# Patient Record
Sex: MALE | Race: OTHER | NOT HISPANIC OR LATINO | URBAN - METROPOLITAN AREA
[De-identification: names, ages, dates, MRNs, and addresses within clinical notes are randomized per-mention and may not be internally consistent; named-entity substitution may affect disease eponyms.]

---

## 2022-06-28 ENCOUNTER — NEW PATIENT (OUTPATIENT)
Dept: URBAN - METROPOLITAN AREA CLINIC 50 | Facility: CLINIC | Age: 67
End: 2022-06-28

## 2022-06-28 DIAGNOSIS — H43.813: ICD-10-CM

## 2022-06-28 DIAGNOSIS — E11.9: ICD-10-CM

## 2022-06-28 DIAGNOSIS — H25.13: ICD-10-CM

## 2022-06-28 PROCEDURE — 92235 FLUORESCEIN ANGRPH MLTIFRAME: CPT

## 2022-06-28 PROCEDURE — 92134 CPTRZ OPH DX IMG PST SGM RTA: CPT

## 2022-06-28 PROCEDURE — 99204 OFFICE O/P NEW MOD 45 MIN: CPT

## 2022-06-28 PROCEDURE — 92201 OPSCPY EXTND RTA DRAW UNI/BI: CPT

## 2022-06-28 PROCEDURE — 92250 FUNDUS PHOTOGRAPHY W/I&R: CPT | Mod: NC

## 2022-06-28 ASSESSMENT — TONOMETRY
OD_IOP_MMHG: 20
OS_IOP_MMHG: 12

## 2022-06-28 ASSESSMENT — VISUAL ACUITY
OD_SC: 20/50-1
OS_SC: 20/60-2
OS_PH: 20/25-2
OD_PH: 20/25-2

## 2023-03-31 ENCOUNTER — APPOINTMENT (OUTPATIENT)
Dept: LAB | Facility: LAB | Age: 68
End: 2023-03-31
Payer: MEDICARE

## 2023-03-31 LAB
ABO GROUP (TYPE) IN BLOOD: NORMAL
ACTIVATED PARTIAL THROMBOPLASTIN TIME IN PPP BY COAGULATION ASSAY: 29 SEC (ref 26–39)
ALANINE AMINOTRANSFERASE (SGPT) (U/L) IN SER/PLAS: 8 U/L (ref 10–52)
ALBUMIN (G/DL) IN SER/PLAS: 3.8 G/DL (ref 3.4–5)
ALKALINE PHOSPHATASE (U/L) IN SER/PLAS: 71 U/L (ref 33–136)
AMYLASE (U/L) IN SER/PLAS: 45 U/L (ref 29–103)
APPEARANCE, URINE: CLEAR
ASPARTATE AMINOTRANSFERASE (SGOT) (U/L) IN SER/PLAS: 15 U/L (ref 9–39)
BILIRUBIN DIRECT (MG/DL) IN SER/PLAS: 0.1 MG/DL (ref 0–0.3)
BILIRUBIN TOTAL (MG/DL) IN SER/PLAS: 0.4 MG/DL (ref 0–1.2)
BILIRUBIN, URINE: NEGATIVE
BLOOD, URINE: ABNORMAL
C PEPTIDE (NG/ML) IN SER/PLAS: 20 NG/ML (ref 0.7–3.9)
COLOR, URINE: YELLOW
CREATININE (MG/DL) IN SER/PLAS: 6.31 MG/DL (ref 0.5–1.3)
EBV INTERPRETATION: ABNORMAL
EPSTEIN-BARR VCA IGG: POSITIVE
EPSTEIN-BARR VCA IGM: NEGATIVE
EPSTEIN-BARR VIRUS EARLY ANTIGEN ANTIBODY, IGG: NEGATIVE
EPSTIEN-BARR NUCLEAR ANTIGEN AB: POSITIVE
ERYTHROCYTE DISTRIBUTION WIDTH (RATIO) BY AUTOMATED COUNT: 13.6 % (ref 11.5–14.5)
ERYTHROCYTE MEAN CORPUSCULAR HEMOGLOBIN CONCENTRATION (G/DL) BY AUTOMATED: 31.5 G/DL (ref 32–36)
ERYTHROCYTE MEAN CORPUSCULAR VOLUME (FL) BY AUTOMATED COUNT: 95 FL (ref 80–100)
ERYTHROCYTES (10*6/UL) IN BLOOD BY AUTOMATED COUNT: 3.77 X10E12/L (ref 4.5–5.9)
ESTIMATED AVERAGE GLUCOSE FOR HBA1C: 117 MG/DL
FLOW AUTOCROSSMATCH: NORMAL
GFR MALE: 9 ML/MIN/1.73M2
GLUCOSE, URINE: ABNORMAL MG/DL
HEMATOCRIT (%) IN BLOOD BY AUTOMATED COUNT: 35.9 % (ref 41–52)
HEMOGLOBIN (G/DL) IN BLOOD: 11.3 G/DL (ref 13.5–17.5)
HEMOGLOBIN A1C/HEMOGLOBIN TOTAL IN BLOOD: 5.7 %
HEPATITIS B VIRUS CORE AB (PRESENCE) IN SER/PLAS BY IMM: NONREACTIVE
HEPATITIS B VIRUS SURFACE AB (MIU/ML) IN SERUM: 223.5 MIU/ML
HEPATITIS B VIRUS SURFACE AG PRESENCE IN SERUM: NONREACTIVE
HEPATITIS C VIRUS AB PRESENCE IN SERUM: NONREACTIVE
HIV 1/ 2 AG/AB SCREEN: NONREACTIVE
HLA CLASS I ANTIBODY SCREEN, FLOW CYTOMETRY: NORMAL
HLA CLASS II ANTIBODY SCREEN, FLOW CYTOMETRY: NORMAL
HLA-A LOCUS LOW RESOLUTION TYPING: NORMAL
HLA-B LOCUS LOW RESOLUTION TYPING: NORMAL
HLA-C LOCUS LOW RESOLUTION TYPING: NORMAL
HLA-DPB1 HIGH RESOLUTION TYPING: NORMAL
HLA-DQB1 HIGH RESOLUTION TYPING: NORMAL
HLA-DR1/3/4/5 + DQB1 LOW RES TYPING: NORMAL
INR IN PPP BY COAGULATION ASSAY: 0.9 (ref 0.9–1.1)
KETONES, URINE: NEGATIVE MG/DL
LEUKOCYTE ESTERASE, URINE: NEGATIVE
LEUKOCYTES (10*3/UL) IN BLOOD BY AUTOMATED COUNT: 10.1 X10E9/L (ref 4.4–11.3)
NITRITE, URINE: NEGATIVE
NRBC (PER 100 WBCS) BY AUTOMATED COUNT: 0 /100 WBC (ref 0–0)
PH, URINE: 8 (ref 5–8)
PHOSPHATE (MG/DL) IN SER/PLAS: 4 MG/DL (ref 2.5–4.9)
PLATELETS (10*3/UL) IN BLOOD AUTOMATED COUNT: 316 X10E9/L (ref 150–450)
PROSTATE SPECIFIC AG (NG/ML) IN SER/PLAS: 0.57 NG/ML (ref 0–4)
PROTEIN TOTAL: 7.1 G/DL (ref 6.4–8.2)
PROTEIN, URINE: ABNORMAL MG/DL
PROTHROMBIN TIME (PT) IN PPP BY COAGULATION ASSAY: 10.1 SEC (ref 9.8–13.4)
RBC, URINE: 2 /HPF (ref 0–5)
RH FACTOR: NORMAL
SPECIFIC GRAVITY, URINE: 1.01 (ref 1–1.03)
SQUAMOUS EPITHELIAL CELLS, URINE: <1 /HPF
SYPHILIS TOTAL AB: NONREACTIVE
UREA NITROGEN (MG/DL) IN SER/PLAS: 21 MG/DL (ref 6–23)
UROBILINOGEN, URINE: <2 MG/DL (ref 0–1.9)
WBC, URINE: 6 /HPF (ref 0–5)

## 2023-04-03 LAB
NIL(NEG) CONTROL SPOT COUNT: ABNORMAL
PANEL A SPOT COUNT: 46
PANEL B SPOT COUNT: 38
POS CONTROL SPOT COUNT: ABNORMAL
T-SPOT. TB INTERPRETATION: ABNORMAL

## 2023-04-04 LAB
AMPHETAMINE SCREEN BLOOD: NEGATIVE NG/ML
BARBITURATE SCREEN BLOOD: NEGATIVE NG/ML
BENZODIAZEPINES SCREEN BLOOD: NEGATIVE NG/ML
BUPRENORPHINE SCREEN BLOOD: NEGATIVE NG/ML
CANNABINOIDS SCREEN BLOOD: NEGATIVE NG/ML
COCAINE SCREEN BLOOD: NEGATIVE NG/ML
DRUG SCREEN COMMENT BLOOD: NORMAL
METHADONE SCREEN BLOOD: NEGATIVE NG/ML
METHAMPHETAMINE, BLOOD, SCREEN: NEGATIVE NG/ML
OPIATE SCREEN BLOOD: NEGATIVE NG/ML
OXYCODONE SCREEN BLOOD: NEGATIVE NG/ML
PHENCYCLIDINE SCREEN BLOOD: NEGATIVE NG/ML

## 2023-04-07 LAB
COTININE BLOOD QUANTITATIVE: 188 NG/ML
NICOTINE BLOOD QUANTITATIVE: 6 NG/ML

## 2023-11-06 ENCOUNTER — TELEPHONE (OUTPATIENT)
Dept: TRANSPLANT | Facility: HOSPITAL | Age: 68
End: 2023-11-06
Payer: MEDICARE

## 2023-11-06 DIAGNOSIS — Z01.818 PRE-TRANSPLANT EVALUATION FOR KIDNEY TRANSPLANT: ICD-10-CM

## 2023-11-06 NOTE — TELEPHONE ENCOUNTER
Spoke to daughter Duncan today.  Her dad is coming in for a cardiology appt on Nov 13; emailed her the information.  Let her know to get labs drawn as well so we can make sure he has an updated HLA and 2nd ABO.  She will send me the details of his colonoscopy.    She did let me know he was recently admitted for pneumonia but is doing better now.    She also has updated cardiac testing that she is going to bring to the upcoming cardiology appointment.

## 2023-11-10 PROBLEM — Z99.2 END STAGE RENAL FAILURE ON DIALYSIS (MULTI): Chronic | Status: ACTIVE | Noted: 2019-03-19

## 2023-11-10 PROBLEM — I10 ESSENTIAL HYPERTENSION: Chronic | Status: ACTIVE | Noted: 2023-11-10

## 2023-11-10 PROBLEM — E83.39 HYPERPHOSPHATEMIA: Chronic | Status: ACTIVE | Noted: 2023-11-10

## 2023-11-10 PROBLEM — Z94.9 TRANSPLANT: Status: ACTIVE | Noted: 2023-11-10

## 2023-11-10 PROBLEM — N39.0 URINARY TRACT INFECTIOUS DISEASE: Chronic | Status: ACTIVE | Noted: 2023-11-10

## 2023-11-10 PROBLEM — N28.9 DISORDER OF KIDNEY: Status: ACTIVE | Noted: 2023-11-10

## 2023-11-10 PROBLEM — L29.9 PRURITUS: Chronic | Status: ACTIVE | Noted: 2023-11-10

## 2023-11-10 PROBLEM — E11.49 DM (DIABETES MELLITUS), TYPE 2 WITH NEUROLOGICAL COMPLICATIONS (MULTI): Status: ACTIVE | Noted: 2023-11-10

## 2023-11-10 PROBLEM — E11.9 DIABETES MELLITUS (MULTI): Status: ACTIVE | Noted: 2023-11-10

## 2023-11-10 PROBLEM — N18.6 END STAGE RENAL FAILURE ON DIALYSIS (MULTI): Chronic | Status: ACTIVE | Noted: 2019-03-19

## 2023-11-10 PROBLEM — Z01.818 PRE-TRANSPLANT EVALUATION FOR KIDNEY TRANSPLANT: Status: ACTIVE | Noted: 2023-11-10

## 2023-11-10 RX ORDER — HYDROXYZINE HYDROCHLORIDE 10 MG/1
10 TABLET, FILM COATED ORAL
COMMUNITY
Start: 2022-03-31

## 2023-11-10 RX ORDER — SITAGLIPTIN 50 MG/1
1 TABLET, FILM COATED ORAL DAILY
COMMUNITY
Start: 2022-04-24

## 2023-11-10 RX ORDER — GUAIFENESIN 100 MG/5ML
SOLUTION ORAL
COMMUNITY
Start: 2023-06-13

## 2023-11-10 RX ORDER — HYDRALAZINE HYDROCHLORIDE 50 MG/1
1 TABLET, FILM COATED ORAL 3 TIMES DAILY
COMMUNITY
Start: 2023-06-16

## 2023-11-10 RX ORDER — DIPHENHYDRAMINE HCL 25 MG
TABLET ORAL
COMMUNITY
Start: 2021-03-29

## 2023-11-10 RX ORDER — BISACODYL 5 MG
10 TABLET, DELAYED RELEASE (ENTERIC COATED) ORAL
COMMUNITY
Start: 2021-03-29

## 2023-11-10 RX ORDER — ROSUVASTATIN CALCIUM 20 MG/1
1 TABLET, COATED ORAL DAILY
COMMUNITY
Start: 2022-03-11

## 2023-11-10 RX ORDER — FERRIC CITRATE 210 MG/1
420 TABLET, COATED ORAL
COMMUNITY

## 2023-11-10 RX ORDER — AMLODIPINE BESYLATE 10 MG/1
1 TABLET ORAL DAILY
COMMUNITY
Start: 2023-07-05

## 2023-11-10 RX ORDER — VALSARTAN 160 MG/1
1 TABLET ORAL DAILY
COMMUNITY
Start: 2022-04-24

## 2023-11-10 RX ORDER — CALCIUM CITRATE/VITAMIN D3 200MG-6.25
TABLET ORAL
COMMUNITY
Start: 2023-06-11

## 2023-11-10 RX ORDER — FOLIC ACID/VIT B COMPLEX AND C 0.8 MG
TABLET ORAL
COMMUNITY
Start: 2023-09-21

## 2023-11-10 RX ORDER — CARVEDILOL 12.5 MG/1
1 TABLET ORAL 2 TIMES DAILY
COMMUNITY
Start: 2023-06-16

## 2023-11-10 RX ORDER — ACETAMINOPHEN 500 MG
1000 TABLET ORAL EVERY 6 HOURS PRN
COMMUNITY

## 2023-11-13 ENCOUNTER — CONSULT (OUTPATIENT)
Dept: CARDIOLOGY | Facility: CLINIC | Age: 68
End: 2023-11-13
Payer: MEDICARE

## 2023-11-13 VITALS
HEART RATE: 72 BPM | DIASTOLIC BLOOD PRESSURE: 71 MMHG | BODY MASS INDEX: 27.33 KG/M2 | HEIGHT: 63 IN | SYSTOLIC BLOOD PRESSURE: 188 MMHG | WEIGHT: 154.25 LBS | OXYGEN SATURATION: 94 %

## 2023-11-13 DIAGNOSIS — I10 ESSENTIAL HYPERTENSION: Primary | Chronic | ICD-10-CM

## 2023-11-13 DIAGNOSIS — Z99.2 END STAGE RENAL FAILURE ON DIALYSIS (MULTI): Chronic | ICD-10-CM

## 2023-11-13 DIAGNOSIS — N18.6 END STAGE RENAL FAILURE ON DIALYSIS (MULTI): Chronic | ICD-10-CM

## 2023-11-13 PROCEDURE — 99204 OFFICE O/P NEW MOD 45 MIN: CPT | Performed by: INTERNAL MEDICINE

## 2023-11-13 PROCEDURE — 93010 ELECTROCARDIOGRAM REPORT: CPT | Performed by: INTERNAL MEDICINE

## 2023-11-13 PROCEDURE — 99214 OFFICE O/P EST MOD 30 MIN: CPT | Mod: PO | Performed by: INTERNAL MEDICINE

## 2023-11-13 PROCEDURE — 93005 ELECTROCARDIOGRAM TRACING: CPT | Mod: PO | Performed by: INTERNAL MEDICINE

## 2023-11-13 ASSESSMENT — PAIN SCALES - GENERAL: PAINLEVEL: 0-NO PAIN

## 2023-11-13 NOTE — PROGRESS NOTES
Primary Care Physician: No primary care provider on file.  Date of Visit: 11/13/2023  9:20 AM EST  [place] [facility] [address]     Chief Complaint:   Prerenal transplant cardiac evaluation       HPI / Summary:   Ronaldo Figueroa is a 68 y.o. male presents for followup.   68-year-old [place] in the [place] area to me today by his daughter seeking [place].  He has a medical connection to [facility].  He has had an extensive cardiac evaluation through [facility] where he has been listed since 2021.  I will partially outlined some of his cardiac testing.  An echo in March 2022showed mild to moderate AI and MR with preserved EF.  He had a calcium score earlier in the year of 269 left main 36, , circumflex 18, RCA 8.  Negative pharmacologic nuclear stress test earlier this year.  Cardiac catheterization in 2021 also at  showed an occluded small nondominant right noncritical left system disease by report.  No prior history of MI, stroke VTE, PAD.  Diabetes since 89.  Hypertension since 2000.  ESRD since 2018.  No intradialytic hypotension or chest discomfort.  Adherent to prescribed meds.  Surgeries include 2 back operations, bowel rupture with surgery in 2008.  Social history Brother had MI at 65.  Former tobacco  use pack-a-day smoker stopped in 2018.    Limited activity tolerance due to back pain.  No active chest discomfort no increasing edema, no orthopnea PND.  2 recent admissions for pneumonia this year known to have COPD            Specialty Problems          Cardiology Problems    Essential hypertension        No past medical history on file.       No past surgical history on file.       Social History:         Allergies:  Allergies   Allergen Reactions    Penicillins Unknown    Shellfish Containing Products Unknown       Outpatient Medications:  Current Outpatient Medications   Medication Instructions    acetaminophen (TYLENOL) 1,000  "mg, oral, Every 6 hours PRN    amLODIPine (Norvasc) 10 mg tablet 1 tablet, oral, Daily    Auryxia 420 mg, oral, 3 times daily with meals    bisacodyl (DULCOLAX (BISACODYL)) 10 mg, oral    carvedilol (Coreg) 12.5 mg tablet 1 tablet, oral, 2 times daily    diphenhydrAMINE (Benadryl Allergy) 25 mg tablet oral    guaiFENesin (Robitussin) 100 mg/5 mL syrup GIVE 5 ML BY MOUTH EVERY 4 HOURS AS NEEDED FOR COUGH    hydrALAZINE (Apresoline) 50 mg tablet 1 tablet, oral, 3 times daily    hydrOXYzine HCL (ATARAX) 10 mg, oral    Januvia 50 mg tablet 1 tablet, oral, Daily    Hanna-Ji 0.8 mg tablet TAKE 1 TABLET BY MOUTH 1 TIME EACH DAY.    rosuvastatin (Crestor) 20 mg tablet 1 tablet, oral, Daily    True Metrix Glucose Test Strip strip CHECK SUGARS TWICE A DAY    valsartan (Diovan) 160 mg tablet 1 tablet, oral, Daily       ROS     Physical Exam:  Vitals:    11/13/23 0929   BP: (!) 188/71   BP Location: Right arm   Patient Position: Sitting   Pulse: 72   SpO2: 94%   Weight: 70 kg (154 lb 4 oz)   Height: 1.6 m (5' 3\")     Wt Readings from Last 5 Encounters:   11/13/23 70 kg (154 lb 4 oz)   03/31/23 72.6 kg (160 lb)     Body mass index is 27.32 kg/m².   Well-developed male no acute distress.  Flat JVP.  Normal carotid upstrokes no bruits.  Regular rhythm no gallop or audible murmur.  Diminished breath sounds.  No crackles or wheezes.  Soft abdomen without masses.  Dialysis access in his right upper extremity some aneurysmal changes.  No dependent edema with intact pedal pulses.       Last Labs:  CMP:  Recent Labs     03/31/23  1152   BUN 21   CREATININE 6.31*     Recent Labs     03/31/23  1152   ALBUMIN 3.8   ALKPHOS 71   ALT 8*   AST 15   BILITOT 0.4     CBC:  Recent Labs     03/31/23  1152   WBC 10.1   HGB 11.3*   HCT 35.9*      MCV 95     COAG:   Recent Labs     03/31/23  1152   INR 0.9     HEME/ENDO:  Recent Labs     03/31/23  1152   HGBA1C 5.7*      CARDIAC: No results for input(s): \"LDH\", \"CKMB\", \"TROPHS\", \"BNP\" in the " "last 86997 hours.    No lab exists for component: \"CK\", \"CKMBP\"No results for input(s): \"CHOL\", \"LDLF\", \"HDL\", \"TRIG\" in the last 15387 hours.    Last Cardiology Tests:  ECG:  Sinus rhythm at a rate of 66 bpm first-degree AV block no pathological Q waves inferior T wave inversions in 3 and aVF    Echo:  Echo Results:  No results found for this or any previous visit from the past 3650 days.       Cath:      Stress Test:  Stress Results:  No results found for this or any previous visit from the past 365 days.         Cardiac Imaging:  No image results found.        Assessment/Plan     68-year-old male on dialysis since 2018 due to the effects of diabetes and hypertension, dyslipidemia, former tobacco use pack-a-day smoker stopped in 2018, COPD, prior back surgery, elevated calcium score of 269, cardiac cath 2021 showed occluded right noncritical CAD on the left, negative nuclear stress test earlier this year, recent echo in March showing mild to moderate AI and mild to moderate MR.  Diminished activity tolerance due to chronic back pain.  I see no cardiac contraindication to transplant and from a cardiac perspective I think his risk to be considered as acceptable.  His cardiac testing has been fairly extensive including a left heart cath 2 years ago recent negative nuclear stress test and recent echo done but he needs an updated cardiac catheterization or CTA at this time thank you for allowing me to participate in his care.      Orders:  Orders Placed This Encounter   Procedures    ECG 12 Lead      Followup Appts:  No future appointments.        ____________________________________________________________  Zhen Harrell MD    Senior Attending Physician  [facility]  [facility]    "

## 2023-11-16 LAB
ATRIAL RATE: 66 BPM
P AXIS: 66 DEGREES
P OFFSET: 157 MS
P ONSET: 117 MS
PR INTERVAL: 206 MS
Q ONSET: 220 MS
QRS COUNT: 11 BEATS
QRS DURATION: 84 MS
QT INTERVAL: 398 MS
QTC CALCULATION(BAZETT): 417 MS
QTC FREDERICIA: 411 MS
R AXIS: 9 DEGREES
T AXIS: -15 DEGREES
T OFFSET: 419 MS
VENTRICULAR RATE: 66 BPM

## 2024-03-03 ENCOUNTER — DOCUMENTATION (OUTPATIENT)
Dept: TRANSPLANT | Facility: HOSPITAL | Age: 69
End: 2024-03-03
Payer: MEDICARE

## 2024-03-03 NOTE — PROGRESS NOTES
Kidney transplant eval chart review done on 03/03/24    Patient needs:      -Pathology from FNA of thyroid nodule - ? Done at [facility]  -Review of chest CT by TI team  -CTA or rpt cath  -Final cardiac risk strat      Colonoscopy - in , rpt 5 years

## 2024-05-14 DIAGNOSIS — Z01.818 PRE-TRANSPLANT EVALUATION FOR KIDNEY TRANSPLANT: ICD-10-CM

## 2024-07-24 ENCOUNTER — TELEPHONE (OUTPATIENT)
Dept: TRANSPLANT | Facility: HOSPITAL | Age: 69
End: 2024-07-24

## 2024-09-04 ENCOUNTER — TELEPHONE (OUTPATIENT)
Dept: TRANSPLANT | Facility: HOSPITAL | Age: 69
End: 2024-09-04
Payer: MEDICARE

## 2024-09-27 ENCOUNTER — DOCUMENTATION (OUTPATIENT)
Dept: TRANSPLANT | Facility: HOSPITAL | Age: 69
End: 2024-09-27
Payer: MEDICARE

## 2025-01-16 ENCOUNTER — DOCUMENTATION (OUTPATIENT)
Facility: HOSPITAL | Age: 70
End: 2025-01-16
Payer: MEDICARE

## 2025-01-16 ENCOUNTER — COMMITTEE REVIEW (OUTPATIENT)
Facility: HOSPITAL | Age: 70
End: 2025-01-16
Payer: MEDICARE

## 2025-01-16 NOTE — COMMITTEE REVIEW
Evaluation Date: 3/31/2023   Committee Review Date: 1/9/2025   Organ being evaluated for: Kidney     Transplant Phase:  Evaluation   Transplant Status: Active     Referring Physician:     Transplant Physician:       Primary Diagnosis:      Committee Members:   Keri Poon RDN, LD   Cheo Kirk Rajdeep, MD PhD; Sandra Medina, MT   Pharmacy Uriah Boone, PharmD    [facility]   Transplant Jona Cotton RN; Maame Gaston RN; Dolores Wilson, GLENNY; Amanda Giordano, GLENNY; Yola Nails RN; Liseth Slaughter RN; Jessa Sun RN   Transplant Nephrology Motnana Alba MD; Avtar Shrestha MD; Lori Guerrero MD   Transplant Surgery Allison Castillo MD; Shree Wilkinson MD       Eligibility:  ESRD and on Dialysis     Relative contraindications:  None     Absolute contraindications:  None         Committee Review Decision:    The candidate's evaluation was presented and discussed at the Transplant Multidisciplinary Selection Conference. After review of the candidate's diagnosis and the evaluations of the multidisciplinary team members, the committee made the following recommendations:     Close the evaluation due to the reason below. Patient can be re-referred once the condition is resolved.    Other: inconsistent follow up with TI.    Discussed at Patient Review 1/2/25.  Patient lives in [place] and has not had consistent follow up since beginning [facility] 3/2023.  Per discussion, close evaluation.    Resolution: Close evaluation due to inconsistent follow up with  Transplant Team.    Lab Results   Component Value Date    HEPBSAB 223.5 03/31/2023       Immunization History   Administered Date(s) Administered Comments    Hep B, 20 yrs and older, Dialysis (RECOMBIVAX) 11/12/2019 12/12/2019 01/16/2020     Hepatitis B vaccine, adult *Check Product/Dose* 04/14/2020

## 2025-04-03 ENCOUNTER — DOCUMENTATION (OUTPATIENT)
Dept: TRANSPLANT | Facility: HOSPITAL | Age: 70
End: 2025-04-03
Payer: MEDICARE

## 2025-04-03 NOTE — PROGRESS NOTES
Discussed at Patient Review 1/2/25.  Patient lives in [place] and has not had consistent follow up since beginning eval 3/2023.  Per discussion, close evaluation.  Resolution: Close evaluation due to inconsistent follow up with  Transplant Team. Updated bundle.